# Patient Record
Sex: FEMALE | NOT HISPANIC OR LATINO | URBAN - METROPOLITAN AREA
[De-identification: names, ages, dates, MRNs, and addresses within clinical notes are randomized per-mention and may not be internally consistent; named-entity substitution may affect disease eponyms.]

---

## 2024-08-06 ENCOUNTER — APPOINTMENT (RX ONLY)
Age: 49
Setting detail: DERMATOLOGY
End: 2024-08-06

## 2024-08-06 DIAGNOSIS — Z41.9 ENCOUNTER FOR PROCEDURE FOR PURPOSES OTHER THAN REMEDYING HEALTH STATE, UNSPECIFIED: ICD-10-CM

## 2024-08-06 PROCEDURE — ? LASER HAIR REMOVAL

## 2024-08-06 NOTE — PROCEDURE: LASER HAIR REMOVAL
Total Pulses: 216
External Cooling: Alexandra Cryo 5
Pulse Duration (Include Units): 20
Cooling: chill tip
Spot Size: 15 mm
Treatment Number: 0
Price (Use Numbers Only, No Special Characters Or $): 150
Spot Size: 10 mm
Render Post-Care In The Note: No
Spot Size: 12 mm
Pre-Procedure: Prior to proceeding the treatment areas were cleaned and all present put on their eye protection.
Cooling Override: Alexandra cooler
Post-Procedure Care: Immediate endpoint: perifollicular erythema and edema. Post care reviewed with patient.
Tolerated Procedure (Optional): Tolerated Well
Detail Level: Zone
External Cooling Fan Speed: 6
Fluence (Will Not Render If 0): 25
Consent: Written consent obtained, risks reviewed including but not limited to crusting, scabbing, blistering, scarring, darker or lighter pigmentary change, paradoxical hair regrowth, incomplete removal of hair and infection.
Eye Shield Text: Given the treatment area eye shields were inserted prior to treatment.
Pulse Duration (Include Units): 5
Shaving (Optional): The patient shaved at home
Laser Type: Alexandrite 755nm
Cooling Override: Cynosure abhilash
Post-Care Instructions: I reviewed with the patient in detail post-care instructions. Patient should avoid sun for a minimum of 4 weeks before and after treatment.

## 2025-02-25 ENCOUNTER — APPOINTMENT (OUTPATIENT)
Age: 50
Setting detail: DERMATOLOGY
End: 2025-02-25

## 2025-02-25 DIAGNOSIS — Z41.9 ENCOUNTER FOR PROCEDURE FOR PURPOSES OTHER THAN REMEDYING HEALTH STATE, UNSPECIFIED: ICD-10-CM

## 2025-02-25 PROCEDURE — ? LASER HAIR REMOVAL

## 2025-02-25 NOTE — PROCEDURE: LASER HAIR REMOVAL
Cooling Override: Cynosure abhilash
Laser Type: Alexandrite 755nm
Pulse Duration (Include Units): 5
Treatment Number: 0
Post-Care Instructions: I reviewed with the patient in detail post-care instructions. Patient should avoid sun for a minimum of 4 weeks before and after treatment.
Detail Level: Zone
Total Area (Optional- Include Units): 2
External Cooling Fan Speed: 6
Cooling: chill tip
Total Pulses: 182
Fluence (Will Not Render If 0): 20
Render Post-Care In The Note: No
Shaving (Optional): The patient shaved at home
Eye Shield Text: Given the treatment area eye shields were inserted prior to treatment.
Spot Size: 15 mm
Consent: Written consent obtained, risks reviewed including but not limited to crusting, scabbing, blistering, scarring, darker or lighter pigmentary change, paradoxical hair regrowth, incomplete removal of hair and infection.
External Cooling: Alexandra Cryo 5
Price (Use Numbers Only, No Special Characters Or $): 150
Cooling Override: Alexandra cooler
Spot Size: 10 mm
Post-Procedure Care: Immediate endpoint: perifollicular erythema and edema. Post care reviewed with patient.
Tolerated Procedure (Optional): Tolerated Well
Were Eye Shields Employed?: Yes
Pre-Procedure: Prior to proceeding the treatment areas were cleaned and all present put on their eye protection.

## 2025-08-19 ENCOUNTER — APPOINTMENT (OUTPATIENT)
Age: 50
Setting detail: DERMATOLOGY
End: 2025-08-19

## 2025-08-19 DIAGNOSIS — Z41.9 ENCOUNTER FOR PROCEDURE FOR PURPOSES OTHER THAN REMEDYING HEALTH STATE, UNSPECIFIED: ICD-10-CM

## 2025-08-19 PROCEDURE — ? LASER HAIR REMOVAL
